# Patient Record
(demographics unavailable — no encounter records)

---

## 2025-06-03 NOTE — HISTORY OF PRESENT ILLNESS
[de-identified] : Referred by: Dr. Sanna Benjamin  Diagnosis: Newly diagnosed right breast cancer Here today with Khai Self (spouse) - ph [phone]   Ms. Self is a post-menopausal female who presented at age 71 in 2025 for evaluation of newly diagnosed right breast cancer.  The patient has a medical history of L breast cancer in  (DCIS only, treated w/ lumpectomy, XRT + tamoxifen x5 yrs), ovarian cysts ( w/ partial LSO), HLD, tinnitus, and anxiety (no prior medication or counseling).  Surgical  hx:  R lumpectomy 2025, L lumpectomy , BTL, ovarian cystectomy x2.    Montserrat has a history of left breast DCIS in , s/p left lumpectomy, RT and tamoxifen x 5 years which was well tolerated. She now presents with contralateral breast cancer, referred by Dr. Sanna Benjamin from breast surgery. She underwent screening mammogram in 2025 which revealed indeterminate calcifications in the right breast and a hypoechoic mass for which biopsy was recommended. Right breast biopsy on 25 revealed DCIS, intermediate grade, w/ central necrosis and calcifications ER >90%, ND 50-60%. Breast MRI on 25 showed right breast biopsy proven malignancy with up to 2.4cm of enhancement, as well as an additional 1.1cm indeterminate enhancement. She underwent right lumpectomy with Dr. 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 Pending receptors.  Referred to medical oncology to discuss next steps in management.   At today's visit she is doing well but very anxious about her diagnosis as expected. Denies recent headaches, visual changes, balance issues, CP, cough, SOB, n/v/d, constipation, unintentional weight loss or new bone or back pain.   Imaging reviewed: Diagnostic MMG/US 25- Indeterminate calcifications in the central right breast, indeterminate hypoechoic mass in the right breast, probably benign hypoechoic mass in the right breast likely complicated cyst, BIRADS 4B Breast MRI 25- right breast biopsy proven malignancy w/ enhancement measuring up to 2.4cm c/w disease extension, additional area of indeterminate enhancement measuring 1.1cm, left breast likely fibroadenoma   Pathology reviewed: Right breast biopsy 25- DCIS, intermediate grade, w/ central necrosis and calcifications ER >90%, ND 50-60% Right lumpectomy 25- preliminary path:  IDC, poorly differentiated measuring 3mm and 2mm, LVI-, +DCIS (int-high grade), +ALH Separate 4mm focus suspicious for mucinous carcinoma  Pending receptors  Genetics:  none prior   HCM: - Colonoscopy:   - no polyps (Dr. Héctor Lopez) - Gyn/pap:  Dr. Jeremy Diana at [facility] - sees annually (no hx abnormal Paps) - Breast imaging:  [facility] in [place] (Vinnie Thurston) - Lung cancer screen:  n/a - Bone density: DEXA - last done  - osteopenia   SH: - Occupation:  Retired   - Living situation:  Lives w/ spouse in [place] - Smoking/etoh/illicits:  Never smoker, occasional glass of wine, no illicits  - Exercise:  gym 5 days/week, walks   OB/GYN Hx: - Age of menarche:  11 - Age of menopause:  48 - Pregnancy history:   LC2 (daughter 43 + son 40, both in [facility]) - Age at live birth:  27 - OCP use: none prior - Fertility treatments:  n/a - Hormonal therapy:  none - Breast feeding history:  < 3 months   FH: -  father - unknown primary ? - mother - COPD (nonsmoker) - mother + sister w/ osteoporosis

## 2025-06-03 NOTE — RESULTS/DATA
[FreeTextEntry1] : #Right breast cancer, stage IA, pending receptors Montserrat has a history of left breast DCIS in 2004, s/p left lumpectomy, RT and tamoxifen x 5 years.  She now presents with contralateral RIGHT breast cancer, referred by Dr. Sanna Benjamin from breast surgery.  She underwent screening mammogram in April 2025 which revealed indeterminate calcifications in the right breast and a hypoechoic mass for which biopsy was recommended.  Right breast biopsy on 5/1/25 revealed DCIS, intermediate grade, w/ central necrosis and calcifications ER >90%, VA 50-60%.  Breast MRI on 5/12/25 showed right breast biopsy proven malignancy with up to 2.4cm of enhancement, as well as an additional 1.1cm indeterminate enhancement.  She underwent right lumpectomy with Dr. Sanna Benjamin on 5/27/25- preliminary pathology revealed:  IDC, poorly differentiated measuring 3mm and 2mm, LVI-, +DCIS (int-high grade), +ALH Separate 4mm focus suspicious for mucinous carcinoma. Pending receptors.  Referred to medical oncology to discuss next steps in management.  We discussed that presuming her tumor is ER/VA+ (similar to the DCIS), the recommended treatment would include antiestrogen therapy, specifically an aromatase inhibitor such as arimidex. She does have concerns about her bone density- will repeat a DEXA at this time. Antiestrogen therapy will start 2-3 weeks after completion of radiation therapy. There is no indication for sending an oncotype given the invasive cancer foci are all <5mm.  She is pending an appointment w/ Dr. Gasca from radiation oncology.  She is aware that if her tumor is HER2+, we may have a discussion about chemotherapy and Her2 directed therapy.  She will discuss with Dr. Benjamin whether a SLNBx is needed depending on the results of the receptors.  She is clinically node negative.  I will see her back in ~6 weeks to finalize the treatment plan. Emotional support provided at today's visit.  Discuss genetic testing at next visit.  All patient questions answered at today's visit. Patient urged to call the office with any questions or concerns.  I personally have spent a total of 60 minutes of time on the date of this encounter reviewing test results, documenting findings, coordinating care and directly consulting with the patient and/or designated family member. Greater than 50% of the face to face encounter time was spent on counseling and/or coordination of care for right breast cancer.

## 2025-06-03 NOTE — CONSULT LETTER
[Dear  ___] : Dear  [unfilled], [Consult Letter:] : I had the pleasure of evaluating your patient, [unfilled]. [Please see my note below.] : Please see my note below. [Consult Closing:] : Thank you very much for allowing me to participate in the care of this patient.  If you have any questions, please do not hesitate to contact me. [Sincerely,] : Sincerely, [FreeTextEntry2] : Dr. Sanna Benjamin  [FreeTextEntry3] : Dr. Lima Ball [DrCaleb  ___] : Dr. GAFFNEY

## 2025-06-03 NOTE — RESULTS/DATA
[FreeTextEntry1] : #Right breast cancer, stage IA, pending receptors Montserrat has a history of left breast DCIS in 2004, s/p left lumpectomy, RT and tamoxifen x 5 years.  She now presents with contralateral RIGHT breast cancer, referred by Dr. Sanna Benjamin from breast surgery.  She underwent screening mammogram in April 2025 which revealed indeterminate calcifications in the right breast and a hypoechoic mass for which biopsy was recommended.  Right breast biopsy on 5/1/25 revealed DCIS, intermediate grade, w/ central necrosis and calcifications ER >90%, W[place] 50-60%.  Breast MRI on 5/12/25 showed right breast biopsy proven malignancy with up to 2.4cm of enhancement, as well as an additional 1.1cm indeterminate enhancement.  She underwent right lumpectomy with Dr. Sanna Benjamin on 5/27/25- preliminary pathology revealed:  IDC, poorly differentiated measuring 3mm and 2mm, LVI-, +DCIS (int-high grade), +ALH Separate 4mm focus suspicious for mucinous carcinoma. Pending receptors.  Referred to medical oncology to discuss next steps in management.  We discussed that presuming her tumor is ER/WY+ (similar to the DCIS), the recommended treatment would include antiestrogen therapy, specifically an aromatase inhibitor such as arimidex. She does have concerns about her bone density- will repeat a DEXA at this time. Antiestrogen therapy will start 2-3 weeks after completion of radiation therapy. There is no indication for sending an oncotype given the invasive cancer foci are all <5mm.  She is pending an appointment w/ Dr. Gasca from radiation oncology.  She is aware that if her tumor is HER2+, we may have a discussion about chemotherapy and Her2 directed therapy.  She will discuss with Dr. Benjamin whether a SLNBx is needed depending on the results of the receptors.  She is clinically node negative.  I will see her back in ~6 weeks to finalize the treatment plan. Emotional support provided at today's visit.  Discuss genetic testing at next visit.  All patient questions answered at today's visit. Patient urged to call the office with any questions or concerns.  I personally have spent a total of 60 minutes of time on the date of this encounter reviewing test results, documenting findings, coordinating care and directly consulting with the patient and/or designated family member. Greater than 50% of the face to face encounter time was spent on counseling and/or coordination of care for right breast cancer.

## 2025-06-03 NOTE — PHYSICAL EXAM
[Fully active, able to carry on all pre-disease performance without restriction] : Status 0 - Fully active, able to carry on all pre-disease performance without restriction [Normal] : affect appropriate [de-identified] : well appearing female, mildly anxious,  NAD  [de-identified] : s/p R lumpectomy, healing well, no palpable masses or LAD bilaterally

## 2025-06-03 NOTE — HISTORY OF PRESENT ILLNESS
[de-identified] : Referred by: Dr. Sanna Benjamin  Diagnosis: Newly diagnosed right breast cancer Here today with Khai Self (spouse) - ph [phone]   Ms. Self is a post-menopausal female who presented at age 71 in 2025 for evaluation of newly diagnosed right breast cancer.  The patient has a medical history of L breast cancer in  (DCIS only, treated w/ lumpectomy, XRT + tamoxifen x5 yrs), ovarian cysts ( w/ partial LSO), HLD, tinnitus, and anxiety (no prior medication or counseling).  Surgical  hx:  R lumpectomy 2025, L lumpectomy , BTL, ovarian cystectomy x2.    Montserrat has a history of left breast DCIS in , s/p left lumpectomy, RT and tamoxifen x 5 years which was well tolerated. She now presents with contralateral breast cancer, referred by Dr. Sanna Benjamin from breast surgery. She underwent screening mammogram in 2025 which revealed indeterminate calcifications in the right breast and a hypoechoic mass for which biopsy was recommended. Right breast biopsy on 25 revealed DCIS, intermediate grade, w/ central necrosis and calcifications ER >90%, MO 50-60%. Breast MRI on 25 showed right breast biopsy proven malignancy with up to 2.4cm of enhancement, as well as an additional 1.1cm indeterminate enhancement. She underwent right lumpectomy with Dr. 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 Pending receptors.  Referred to medical oncology to discuss next steps in management.   At today's visit she is doing well but very anxious about her diagnosis as expected. Denies recent headaches, visual changes, balance issues, CP, cough, SOB, n/v/d, constipation, unintentional weight loss or new bone or back pain.   Imaging reviewed: Diagnostic MMG/US 25- Indeterminate calcifications in the central right breast, indeterminate hypoechoic mass in the right breast, probably benign hypoechoic mass in the right breast likely complicated cyst, BIRADS 4B Breast MRI 25- right breast biopsy proven malignancy w/ enhancement measuring up to 2.4cm c/w disease extension, additional area of indeterminate enhancement measuring 1.1cm, left breast likely fibroadenoma   Pathology reviewed: Right breast biopsy 25- DCIS, intermediate grade, w/ central necrosis and calcifications ER >90%, MO 50-60% Right lumpectomy 25- preliminary path:  IDC, poorly differentiated measuring 3mm and 2mm, LVI-, +DCIS (int-high grade), +ALH Separate 4mm focus suspicious for mucinous carcinoma  Pending receptors  Genetics:  none prior   HCM: - Colonoscopy:   - no polyps (Dr. Héctor Lopez) - Gyn/pap:  Dr. Jeremy Diana at [facility] - sees annually (no hx abnormal Paps) - Breast imaging:  [facility] in [place] (Vinnie Thurston) - Lung cancer screen:  n/a - Bone density: DEXA - last done  - osteopenia   SH: - Occupation:  Retired   - Living situation:  Lives w/ spouse in [place] - Smoking/etoh/illicits:  Never smoker, occasional glass of wine, no illicits  - Exercise:  gym 5 days/week, walks   OB/GYN Hx: - Age of menarche:  11 - Age of menopause:  48 - Pregnancy history:   LC2 (daughter 43 + son 40, both in [facility]) - Age at live birth:  27 - OCP use: none prior - Fertility treatments:  n/a - Hormonal therapy:  none - Breast feeding history:  < 3 months   FH: -  father - unknown primary ? - mother - COPD (nonsmoker) - mother + sister w/ osteoporosis

## 2025-06-03 NOTE — PHYSICAL EXAM
[Fully active, able to carry on all pre-disease performance without restriction] : Status 0 - Fully active, able to carry on all pre-disease performance without restriction [Normal] : affect appropriate [de-identified] : well appearing female, mildly anxious,  NAD  [de-identified] : s/p R lumpectomy, healing well, no palpable masses or LAD bilaterally

## 2025-06-13 NOTE — PHYSICAL EXAM
[Breast Abnormal Lactation (Galactorrhea)] : no nipple discharge [No UE Edema] : there is no upper extremity edema [Musculoskeletal - Swelling] : no joint swelling [Range of Motion to Joints] : range of motion to joints [No Focal Deficits] : no focal deficits [Normal] : oriented to person, place and time, the affect was normal, the mood was normal and not anxious [Oriented To Time, Place, And Person] : oriented to person, place, and time [de-identified] : healing circumareolar medial scar R breast. L breast healed scar LIQ with puckering. No erythema b/l.

## 2025-06-13 NOTE — HISTORY OF PRESENT ILLNESS
[FreeTextEntry1] : The patient is a pleasant 71 year old female diagnosed with right breast cancer referred by Dr. Benjamin.  The patient has a medical history of L breast cancer in 2004. Pathology was DCIS treated w/ lumpectomy, XRT ([place] Dr. Van 6 weeks) + tamoxifen x5 yrs, SHe has history of ovarian cysts (1975 w/ partial LSO), HLD, tinnitus, and anxiety (no prior medication or counseling). Surgical hx: R lumpectomy 5/2025, L lumpectomy 2004, BTL, ovarian cystectomy x2.  Screening mammogram in April 2025 showed indeterminate calcifications in the right central breast and a hypoechoic mass for which biopsy was recommended.   Right breast biopsy on 5/1/25 revealed DCIS, solid pattern, intermediate grade, w/ central necrosis and calcifications, ER >90%, MN 50-60%. [id number] FISH not amplified.  Breast MRI on 5/12/25 showed right breast biopsy proven malignancy with up to 2.4 cm of enhancement, as well as an additional 1.1 cm indeterminate enhancement.  Right lumpectomy with Dr. Sanna Benjamin on 5/27/25, pathology revealed:  IDC, mucinous carcinoma, moderately differentiated measuring 5 mm, with two additional foci of Invasive Ductal carcinoma, poorly differentiated, Dallas score 8/9, 3mm and 2mm. LVI not identified, DCIS solid and micropapillary pattern with focal pagetoid growth, intermediate to high grade, negative for EIC, present in 6/19 slides. ALH identified. Margins negative.  For 5mm mucinous carcinoma: ER > 95 %, MN 50%% positive, HER2 1+ For 3mm IDC: ER >95%, MN 1-2 %, HER2 Equivocal 2+, CISH performed and negative.  She is a retired [employer]  in [place]. She lives with her  in [place]. She goes to the gym five days a week and does Elliptical, bike, strength training and walking. She admits to anxiety and reports she is healing well. She has a vacation planned with her  at the end of August 2025 to [facility]. She has a 41 yo daughter in [place] and 41 yo son in [facility].She has not had genetic testing but is considering. She presents with her  to discuss the role of radiotherapy in her care.

## 2025-06-13 NOTE — HISTORY OF PRESENT ILLNESS
[FreeTextEntry1] : The patient is a pleasant 71 year old female diagnosed with right breast cancer referred by Dr. Benjamin.  The patient has a medical history of L breast cancer in 2004. Pathology was DCIS treated w/ lumpectomy, XRT ([place] Dr. Van 6 weeks) + tamoxifen x5 yrs, SHe has history of ovarian cysts (1975 w/ partial LSO), HLD, tinnitus, and anxiety (no prior medication or counseling). Surgical hx: R lumpectomy 5/2025, L lumpectomy 2004, BTL, ovarian cystectomy x2.  Screening mammogram in April 2025 showed indeterminate calcifications in the right central breast and a hypoechoic mass for which biopsy was recommended.   Right breast biopsy on 5/1/25 revealed DCIS, solid pattern, intermediate grade, w/ central necrosis and calcifications, ER >90%, UT 50-60%. [id number] FISH not amplified.  Breast MRI on 5/12/25 showed right breast biopsy proven malignancy with up to 2.4 cm of enhancement, as well as an additional 1.1 cm indeterminate enhancement.  Right lumpectomy with Dr. Sanna Benjamin on 5/27/25, pathology revealed:  IDC, mucinous carcinoma, moderately differentiated measuring 5 mm, with two additional foci of Invasive Ductal carcinoma, poorly differentiated, Dallas score 8/9, 3mm and 2mm. LVI not identified, DCIS solid and micropapillary pattern with focal pagetoid growth, intermediate to high grade, negative for EIC, present in 6/19 slides. ALH identified. Margins negative.  For 5mm mucinous carcinoma: ER > 95 %, UT 50%% positive, HER2 1+ For 3mm IDC: ER >95%, UT 1-2 %, HER2 Equivocal 2+, CISH performed and negative.  She is a retired [employer]  in [place]. She lives with her  in [place]. She goes to the gym five days a week and does Elliptical, bike, strength training and walking. She admits to anxiety and reports she is healing well. She has a vacation planned with her  at the end of August 2025 to [facility]. She has a 43 yo daughter in [place] and 41 yo son in [facility].She has not had genetic testing but is considering. She presents with her  to discuss the role of radiotherapy in her care.

## 2025-06-13 NOTE — PHYSICAL EXAM
[Breast Abnormal Lactation (Galactorrhea)] : no nipple discharge [No UE Edema] : there is no upper extremity edema [Musculoskeletal - Swelling] : no joint swelling [Range of Motion to Joints] : range of motion to joints [No Focal Deficits] : no focal deficits [Normal] : oriented to person, place and time, the affect was normal, the mood was normal and not anxious [Oriented To Time, Place, And Person] : oriented to person, place, and time [de-identified] : healing circumareolar medial scar R breast. L breast healed scar LIQ with puckering. No erythema b/l.

## 2025-06-13 NOTE — VITALS
[Maximal Pain Intensity: 0/10] : 0/10 [Least Pain Intensity: 0/10] : 0/10 [90: Able to carry normal activity; minor signs or symptoms of disease.] : 90: Able to carry normal activity; minor signs or symptoms of disease.  [Date: ____________] : Patient's last distress assessment performed on [unfilled]. [8 - Distress Level] : Distress Level: 8 [Referred Patient  to social work for follow-up] : Patient was referred to social work for follow-up [Patient given social work contact information and resource sheet] : Patient was given social work contact information and resource sheet

## 2025-07-08 NOTE — HISTORY OF PRESENT ILLNESS
[FreeTextEntry1] : The patient is a pleasant 71 year old female diagnosed with right breast cancer referred by Dr. Benjamin.  The patient has a medical history of L breast cancer in 2004. Pathology was DCIS treated w/ lumpectomy, XRT ([place] Dr. Van 6 weeks) + tamoxifen x5 yrs, SHe has history of ovarian cysts (1975 w/ partial LSO), HLD, tinnitus, and anxiety (no prior medication or counseling). Surgical hx: R lumpectomy 5/2025, L lumpectomy 2004, BTL, ovarian cystectomy x2.  Screening mammogram in April 2025 showed indeterminate calcifications in the right central breast and a hypoechoic mass for which biopsy was recommended.   Right breast biopsy on 5/1/25 revealed DCIS, solid pattern, intermediate grade, w/ central necrosis and calcifications, ER >90%, AL 50-60%. [id number] FISH not amplified.  Breast MRI on 5/12/25 showed right breast biopsy proven malignancy with up to 2.4 cm of enhancement, as well as an additional 1.1 cm indeterminate enhancement.  Right lumpectomy with Dr. Sanna Benjamin on 5/27/25, pathology revealed:  IDC, mucinous carcinoma, moderately differentiated measuring 5 mm, with two additional foci of Invasive Ductal carcinoma, poorly differentiated, Dallas score 8/9, 3mm and 2mm. LVI not identified, DCIS solid and micropapillary pattern with focal pagetoid growth, intermediate to high grade, negative for EIC, present in 6/19 slides. ALH identified. Margins negative.  For 5mm mucinous carcinoma: ER > 95 %, AL 50%% positive, HER2 1+ For 3mm IDC: ER >95%, AL 1-2 %, HER2 Equivocal 2+, CISH performed and negative.  She is a retired [employer]  in [place]. She lives with her  in [place]. She goes to the gym five days a week and does Elliptical, bike, strength training and walking. She admits to anxiety and reports she is healing well. She has a vacation planned with her  at the end of August 2025 to [facility]. She has a 43 yo daughter in [place] and 41 yo son in [facility].She has not had genetic testing but is considering. She presents with her  to discuss the role of radiotherapy in her care.  She consented to APBI.   7/8/25 Patient presents for OTV, fx 2/5 R APBI. She feels well Persistent faint central R breast erythema and using Rejuvaskin. Trying reflexology today and enjoying speaking with out  Jeremy.

## 2025-07-08 NOTE — DISEASE MANAGEMENT
[Pathological] : TNM Stage: p [IA] : IA [TTNM] : 1a [NTNM] : x [MTNM] : 0 [de-identified] : 104 [de-identified] : 6233 [de-identified] : right breast

## 2025-07-08 NOTE — PHYSICAL EXAM
[Breast Abnormal Lactation (Galactorrhea)] : no nipple discharge [No UE Edema] : there is no upper extremity edema [Musculoskeletal - Swelling] : no joint swelling [Range of Motion to Joints] : range of motion to joints [No Focal Deficits] : no focal deficits [Normal] : oriented to person, place and time, the affect was normal, the mood was normal and not anxious [Oriented To Time, Place, And Person] : oriented to person, place, and time [de-identified] : healing circumareolar medial scar R breast. L breast healed scar LIQ with puckering. faint erythema central R breast

## 2025-07-18 NOTE — PHYSICAL EXAM
[Fully active, able to carry on all pre-disease performance without restriction] : Status 0 - Fully active, able to carry on all pre-disease performance without restriction [Normal] : affect appropriate [de-identified] : well appearing female, mildly anxious,  NAD  [de-identified] : s/p R lumpectomy, healing well, no palpable masses or LAD bilaterally, mild erythema of right breast

## 2025-07-18 NOTE — HISTORY OF PRESENT ILLNESS
[de-identified] : Referred by: Dr. Sanna Benjamin  Here today with Khai Self (spouse) -  [phone]   Breast Cancer Summary:  DIAGNOSIS: Right breast cancer  PROCEDURE AND DATE: right lumpectomy with Dr. 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 ER+ >95%, AZ+ >50%, Her 2 neg 1+ STAGE: pT1c POSTOPERATIVE TREATMENT: Chemotherapy: no role for oncotype  Radiation: s/p R partial breast RT w/ Dr. Gasca from 25-25 (5 fractions).  Hormonal: pending  STATUS: MACK  BRCA/Genetics STATUS: discuss at next visit   Ms. Self is a post-menopausal female who presented at age 71 in 2025 for evaluation of newly diagnosed right breast cancer.  The patient has a medical history of L breast cancer in  (DCIS only, treated w/ lumpectomy, XRT + tamoxifen x5 yrs), ovarian cysts (1975 w/ partial LSO), HLD, tinnitus, and anxiety (no prior medication or counseling).  Surgical  hx:  R lumpectomy 2025, L lumpectomy , BTL, ovarian cystectomy x2.    Montserrat has a history of left breast DCIS in , s/p left lumpectomy, RT and tamoxifen x 5 years which was well tolerated. She now presents with contralateral breast cancer, referred by Dr. Sanna Benjamin from breast surgery. She underwent screening mammogram in 2025 which revealed indeterminate calcifications in the right breast and a hypoechoic mass for which biopsy was recommended. Right breast biopsy on 25 revealed DCIS, intermediate grade, w/ central necrosis and calcifications ER >90%, AZ 50-60%. Breast MRI on 25 showed right breast biopsy proven malignancy with up to 2.4cm of enhancement, as well as an additional 1.1cm indeterminate enhancement. She underwent right lumpectomy with Dr. 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 Pending receptors.  Referred to medical oncology to discuss next steps in management.   At today's visit she is doing well but very anxious about her diagnosis as expected. Denies recent headaches, visual changes, balance issues, CP, cough, SOB, n/v/d, constipation, unintentional weight loss or new bone or back pain.   Imaging reviewed: Diagnostic MMG/US 25- Indeterminate calcifications in the central right breast, indeterminate hypoechoic mass in the right breast, probably benign hypoechoic mass in the right breast likely complicated cyst, BIRADS 4B Breast MRI 25- right breast biopsy proven malignancy w/ enhancement measuring up to 2.4cm c/w disease extension, additional area of indeterminate enhancement measuring 1.1cm, left breast likely fibroadenoma   Pathology reviewed: Right breast biopsy 25- DCIS, intermediate grade, w/ central necrosis and calcifications ER >90%, AZ 50-60% Right lumpectomy 25- preliminary path:  IDC, poorly differentiated measuring 3mm and 2mm, LVI-, +DCIS (int-high grade), +ALH Separate 5mm focus suspicious for mucinous carcinoma -ER+ >95%, AZ+ >50%, Her 2 neg 1+  Genetics:  none prior   HCM: - Colonoscopy:   - no polyps (Dr. Héctor Lopez) - Gyn/pap:  Dr. Jeremy Diana at [facility] - sees annually (no hx abnormal Paps) - Breast imaging:  [facility] in [place] (Vinnie Thurston) - Lung cancer screen:  n/a - Bone density: DEXA - last done  - osteopenia   SH: - Occupation:  Retired   - Living situation:  Lives w/ spouse in [place] - Smoking/etoh/illicits:  Never smoker, occasional glass of wine, no illicits  - Exercise:  gym 5 days/week, walks   OB/GYN Hx: - Age of menarche:  11 - Age of menopause:  48 - Pregnancy history:   LC2 (daughter 43 + son 40, both in [facility]) - Age at live birth:  27 - OCP use: none prior - Fertility treatments:  n/a - Hormonal therapy:  none - Breast feeding history:  < 3 months   FH: -  father - unknown primary ? - mother - COPD (nonsmoker) - mother + sister w/ osteoporosis   [de-identified] : Montserrat presents for follow up for right breast cancer on 7/18/25.   At today's visit she is doing very well.  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 Gasca from 7/7/25-7/11/25 (5 fractions) which she tolerated well.  DEXA 6/11/25- osteoporosis T - 3.1  Takes vitamin D and calcium, she is physically active and does weights.  She reports chronic lower back pain over the past 4 years. Declines MRI spine at this time.  Denies recent headaches, visual changes, balance issues, CP, cough, SOB, n/v/d, constipation, unintentional weight loss.

## 2025-07-18 NOTE — HISTORY OF PRESENT ILLNESS
[de-identified] : Referred by: Dr. Sanna Benjamin  Here today with Khai Self (spouse) -  [phone]   Breast Cancer Summary:  DIAGNOSIS: Right breast cancer  PROCEDURE AND DATE: right lumpectomy with Dr. Sanna Benjamin on 25 PATHOLOGY:  IDC, poorly differentiated measuring 3mm and 2mm, LVI-, +DCIS (int-high grade), +ALH 3mm IDC- ER+ >95%, OK+ 1-2%, Her2 2+, FISH negative (non-amplified) Separate 5mm focus suspicious for mucinous carcinoma. ER+ >95%, OK+ >50%, Her 2 neg 1+ STAGE: pT1c POSTOPERATIVE TREATMENT: Chemotherapy: no role for oncotype  Radiation: s/p R partial breast RT w/ Dr. Gasca from 25-25 (5 fractions).  Hormonal: pending  STATUS: MACK  BRCA/Genetics STATUS: discuss at next visit   Ms. Self is a post-menopausal female who presented at age 71 in 2025 for evaluation of newly diagnosed right breast cancer.  The patient has a medical history of L breast cancer in  (DCIS only, treated w/ lumpectomy, XRT + tamoxifen x5 yrs), ovarian cysts (1975 w/ partial LSO), HLD, tinnitus, and anxiety (no prior medication or counseling).  Surgical  hx:  R lumpectomy 2025, L lumpectomy , BTL, ovarian cystectomy x2.    Montserrat has a history of left breast DCIS in , s/p left lumpectomy, RT and tamoxifen x 5 years which was well tolerated. She now presents with contralateral breast cancer, referred by Dr. Sanna Benjamin from breast surgery. She underwent screening mammogram in 2025 which revealed indeterminate calcifications in the right breast and a hypoechoic mass for which biopsy was recommended. Right breast biopsy on 25 revealed DCIS, intermediate grade, w/ central necrosis and calcifications ER >90%, OK 50-60%. Breast MRI on 25 showed right breast biopsy proven malignancy with up to 2.4cm of enhancement, as well as an additional 1.1cm indeterminate enhancement. She underwent right lumpectomy with Dr. 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 Pending receptors.  Referred to medical oncology to discuss next steps in management.   At today's visit she is doing well but very anxious about her diagnosis as expected. Denies recent headaches, visual changes, balance issues, CP, cough, SOB, n/v/d, constipation, unintentional weight loss or new bone or back pain.   Imaging reviewed: Diagnostic MMG/US 25- Indeterminate calcifications in the central right breast, indeterminate hypoechoic mass in the right breast, probably benign hypoechoic mass in the right breast likely complicated cyst, BIRADS 4B Breast MRI 25- right breast biopsy proven malignancy w/ enhancement measuring up to 2.4cm c/w disease extension, additional area of indeterminate enhancement measuring 1.1cm, left breast likely fibroadenoma   Pathology reviewed: Right breast biopsy 25- DCIS, intermediate grade, w/ central necrosis and calcifications ER >90%, OK 50-60% Right lumpectomy 25- preliminary path:  IDC, poorly differentiated measuring 3mm and 2mm, LVI-, +DCIS (int-high grade), +ALH Separate 5mm focus suspicious for mucinous carcinoma -ER+ >95%, OK+ >50%, Her 2 neg 1+  Genetics:  none prior   HCM: - Colonoscopy:   - no polyps (Dr. Héctor Lopez) - Gyn/pap:  Dr. Jeremy Diana at [facility] - sees annually (no hx abnormal Paps) - Breast imaging:  [facility] in [place] (Vinnie Thurston) - Lung cancer screen:  n/a - Bone density: DEXA - last done  - osteopenia   SH: - Occupation:  Retired   - Living situation:  Lives w/ spouse in [place] - Smoking/etoh/illicits:  Never smoker, occasional glass of wine, no illicits  - Exercise:  gym 5 days/week, walks   OB/GYN Hx: - Age of menarche:  11 - Age of menopause:  48 - Pregnancy history:   LC2 (daughter 43 + son 40, both in [facility]) - Age at live birth:  27 - OCP use: none prior - Fertility treatments:  n/a - Hormonal therapy:  none - Breast feeding history:  < 3 months   FH: -  father - unknown primary ? - mother - COPD (nonsmoker) - mother + sister w/ osteoporosis   [de-identified] : Montserrat presents for follow up for right breast cancer on 7/18/25.   At today's visit she is doing very well.  Receptors on the 5mm mucinous carcinoma resulted as: ER+ >95%, VT+ >50%, Her 2 neg 1+ She is s/p R partial breast RT w/ Dr. Gasca from 7/7/25-7/11/25 (5 fractions) which she tolerated well.  DEXA 6/11/25- osteoporosis T - 3.1  Takes vitamin D and calcium, she is physically active and does weights.  She reports chronic lower back pain over the past 4 years. Declines MRI spine at this time.  Denies recent headaches, visual changes, balance issues, CP, cough, SOB, n/v/d, constipation, unintentional weight loss.

## 2025-07-18 NOTE — PHYSICAL EXAM
[Fully active, able to carry on all pre-disease performance without restriction] : Status 0 - Fully active, able to carry on all pre-disease performance without restriction [Normal] : affect appropriate [de-identified] : well appearing female, mildly anxious,  NAD  [de-identified] : s/p R lumpectomy, healing well, no palpable masses or LAD bilaterally, mild erythema of right breast

## 2025-07-18 NOTE — RESULTS/DATA
[FreeTextEntry1] : #Right breast cancer, stage IA, pending receptors Montserrat has a history of left breast DCIS in 2004, s/p left lumpectomy, RT and tamoxifen x 5 years.  She now presents with contralateral RIGHT breast cancer, referred by Dr. Sanna Benjamin from breast surgery.  She underwent screening mammogram in April 2025 which revealed indeterminate calcifications in the right breast and a hypoechoic mass for which biopsy was recommended.  Right breast biopsy on 5/1/25 revealed DCIS, intermediate grade, w/ central necrosis and calcifications ER >90%, CT 50-60%.  Breast MRI on 5/12/25 showed right breast biopsy proven malignancy with up to 2.4cm of enhancement, as well as an additional 1.1cm indeterminate enhancement.  She underwent right lumpectomy with Dr. Sanna Benjamin on 5/27/25- preliminary pathology revealed:  IDC, poorly differentiated measuring 3mm and 2mm, LVI-, +DCIS (int-high grade), +ALH Separate 5 mm focus suspicious for mucinous carcinoma. ER+ >95%, CT+ >50%, Her 2 neg 1+ She is clinically node negative.  Referred to medical oncology to discuss next steps in management.   We discussed treatment for stage I ER/CT+ breast cancer.  She is s/p R partial breast RT w/ Dr. Gasca from 7/7/25-7/11/25 (5 fractions) which she tolerated well.  Antiestrogen therapy will be recommended. Given that she received tamoxifen in the past, would recommend treatment with arimidex x 5-7 years.  We discussed treatment with Arimidex 1mg PO daily for 5-7 years. Risks of hot flashes, vaginal dryness and bone loss were reviewed.  Will monitor bone density closely with DEXA every 2 years. Recommend vitamin D supplementation 2000 units daily. Obtain calcium from diet if possible. Recommended 30 minutes of exercise daily to prevent joint pains. DEXA 6/11/25- osteoporosis T - 3.1.  We discussed treatment with zometa (zoledronic acid) for management of osteopenia/osteoporosis. Side effects including infusion reaction and 2% risk of osteonecrosis of the jaw were discussed. We will check renal function prior to proceeding with zometa. They will also require dental clearance, dental letter provided at today's visit. Will arrange zometa for next visit. The patient is in agreement with this plan. Continue vitamin D supplementation, 2000 units daily. Continue calcium and vitamin D.  She will start arimidex with QOD dosing and uptitrate to daily dosing after her upcoming trip.  Arimidex to start on 8/1/25.  RTC 9/9/25 for zometa + MD visit to assess tolerance of arimidex.  All patient questions answered at today's visit. Patient urged to call the office with any questions or concerns.  I personally have spent a total of 35 minutes of time on the date of this encounter reviewing test results, documenting findings, coordinating care and directly consulting with the patient and/or designated family member. Greater than 50% of the face to face encounter time was spent on counseling and/or coordination of care for right breast cancer.

## 2025-07-18 NOTE — RESULTS/DATA
[FreeTextEntry1] : #Right breast cancer, stage IA, pending receptors Montserrat has a history of left breast DCIS in 2004, s/p left lumpectomy, RT and tamoxifen x 5 years.  She now presents with contralateral RIGHT breast cancer, referred by Dr. Sanna Benjamin from breast surgery.  She underwent screening mammogram in April 2025 which revealed indeterminate calcifications in the right breast and a hypoechoic mass for which biopsy was recommended.  Right breast biopsy on 5/1/25 revealed DCIS, intermediate grade, w/ central necrosis and calcifications ER >90%, TN 50-60%.  Breast MRI on 5/12/25 showed right breast biopsy proven malignancy with up to 2.4cm of enhancement, as well as an additional 1.1cm indeterminate enhancement.  She underwent right lumpectomy with Dr. Sanna Benjamin on 5/27/25- preliminary pathology revealed:  IDC, poorly differentiated measuring 3mm and 2mm, LVI-, +DCIS (int-high grade), +ALH Separate 5 mm focus suspicious for mucinous carcinoma. ER+ >95%, TN+ >50%, Her 2 neg 1+ She is clinically node negative.  Referred to medical oncology to discuss next steps in management.   We discussed treatment for stage I ER/TN+ breast cancer.  She is s/p R partial breast RT w/ Dr. Gasca from 7/7/25-7/11/25 (5 fractions) which she tolerated well.  Antiestrogen therapy will be recommended. Given that she received tamoxifen in the past, would recommend treatment with arimidex x 5-7 years.  We discussed treatment with Arimidex 1mg PO daily for 5-7 years. Risks of hot flashes, vaginal dryness and bone loss were reviewed.  Will monitor bone density closely with DEXA every 2 years. Recommend vitamin D supplementation 2000 units daily. Obtain calcium from diet if possible. Recommended 30 minutes of exercise daily to prevent joint pains. DEXA 6/11/25- osteoporosis T - 3.1.  We discussed treatment with zometa (zoledronic acid) for management of osteopenia/osteoporosis. Side effects including infusion reaction and 2% risk of osteonecrosis of the jaw were discussed. We will check renal function prior to proceeding with zometa. They will also require dental clearance, dental letter provided at today's visit. Will arrange zometa for next visit. The patient is in agreement with this plan. Continue vitamin D supplementation, 2000 units daily. Continue calcium and vitamin D.  She will start arimidex with QOD dosing and uptitrate to daily dosing after her upcoming trip.  Arimidex to start on 8/1/25.  RTC 9/9/25 for zometa + MD visit to assess tolerance of arimidex.  All patient questions answered at today's visit. Patient urged to call the office with any questions or concerns.  I personally have spent a total of 35 minutes of time on the date of this encounter reviewing test results, documenting findings, coordinating care and directly consulting with the patient and/or designated family member. Greater than 50% of the face to face encounter time was spent on counseling and/or coordination of care for right breast cancer.